# Patient Record
Sex: FEMALE | Race: WHITE
[De-identification: names, ages, dates, MRNs, and addresses within clinical notes are randomized per-mention and may not be internally consistent; named-entity substitution may affect disease eponyms.]

---

## 2019-11-04 ENCOUNTER — HOSPITAL ENCOUNTER (INPATIENT)
Dept: HOSPITAL 74 - JER | Age: 24
LOS: 3 days | Discharge: HOME | DRG: 420 | End: 2019-11-07
Attending: NURSE PRACTITIONER | Admitting: INTERNAL MEDICINE
Payer: COMMERCIAL

## 2019-11-04 VITALS — BODY MASS INDEX: 15 KG/M2

## 2019-11-04 DIAGNOSIS — E87.6: ICD-10-CM

## 2019-11-04 DIAGNOSIS — R64: ICD-10-CM

## 2019-11-04 DIAGNOSIS — E10.65: Primary | ICD-10-CM

## 2019-11-04 DIAGNOSIS — E86.9: ICD-10-CM

## 2019-11-04 DIAGNOSIS — R10.32: ICD-10-CM

## 2019-11-04 DIAGNOSIS — R00.0: ICD-10-CM

## 2019-11-04 DIAGNOSIS — E43: ICD-10-CM

## 2019-11-04 DIAGNOSIS — E87.1: ICD-10-CM

## 2019-11-04 LAB
ALBUMIN SERPL-MCNC: 3.5 G/DL (ref 3.4–5)
ALP SERPL-CCNC: 177 U/L (ref 45–117)
ALT SERPL-CCNC: 11 U/L (ref 13–61)
ANION GAP SERPL CALC-SCNC: 9 MMOL/L (ref 8–16)
APPEARANCE UR: (no result)
AST SERPL-CCNC: 4 U/L (ref 15–37)
BACTERIA # UR AUTO: (no result) /HPF
BASOPHILS # BLD: 0.5 % (ref 0–2)
BILIRUB SERPL-MCNC: 0.3 MG/DL (ref 0.2–1)
BILIRUB UR STRIP.AUTO-MCNC: NEGATIVE MG/DL
BUN SERPL-MCNC: 8.4 MG/DL (ref 7–18)
CALCIUM SERPL-MCNC: 8.9 MG/DL (ref 8.5–10.1)
CHLORIDE SERPL-SCNC: 88 MMOL/L (ref 98–107)
CO2 SERPL-SCNC: 31 MMOL/L (ref 21–32)
COLOR UR: YELLOW
CREAT SERPL-MCNC: 0.8 MG/DL (ref 0.55–1.3)
DEPRECATED RDW RBC AUTO: 13.9 % (ref 11.6–15.6)
EOSINOPHIL # BLD: 0.2 % (ref 0–4.5)
GLUCOSE SERPL-MCNC: 605 MG/DL (ref 74–106)
HCT VFR BLD CALC: 39.9 % (ref 32.4–45.2)
HGB BLD-MCNC: 13 GM/DL (ref 10.7–15.3)
INR BLD: 0.92 (ref 0.83–1.09)
KETONES UR QL STRIP: (no result)
LEUKOCYTE ESTERASE UR QL STRIP.AUTO: (no result)
LIPASE SERPL-CCNC: 147 U/L (ref 73–393)
LYMPHOCYTES # BLD: 33.1 % (ref 8–40)
MAGNESIUM SERPL-MCNC: 2.1 MG/DL (ref 1.8–2.4)
MCH RBC QN AUTO: 27.5 PG (ref 25.7–33.7)
MCHC RBC AUTO-ENTMCNC: 32.5 G/DL (ref 32–36)
MCV RBC: 84.7 FL (ref 80–96)
MONOCYTES # BLD AUTO: 7.5 % (ref 3.8–10.2)
NEUTROPHILS # BLD: 58.7 % (ref 42.8–82.8)
NITRITE UR QL STRIP: NEGATIVE
PH BLDV: 7.37 [PH] (ref 7.31–7.41)
PH UR: 6.5 [PH] (ref 5–8)
PHOSPHATE SERPL-MCNC: 3.4 MG/DL (ref 2.5–4.9)
PLATELET # BLD AUTO: 340 K/MM3 (ref 134–434)
PMV BLD: 9.4 FL (ref 7.5–11.1)
POTASSIUM SERPLBLD-SCNC: 3.9 MMOL/L (ref 3.5–5.1)
PROT SERPL-MCNC: 7.7 G/DL (ref 6.4–8.2)
PROT UR QL STRIP: (no result)
PROT UR QL STRIP: NEGATIVE
PT PNL PPP: 10.9 SEC (ref 9.7–13)
RBC # BLD AUTO: 4.71 M/MM3 (ref 3.6–5.2)
SODIUM SERPL-SCNC: 128 MMOL/L (ref 136–145)
SP GR UR: 1.04 (ref 1.01–1.03)
UROBILINOGEN UR STRIP-MCNC: 0.2 MG/DL (ref 0.2–1)
VENOUS PC02: 52.3 MMHG (ref 38–52)
VENOUS PO2: < 49 MMHG (ref 28–48)
WBC # BLD AUTO: 6 K/MM3 (ref 4–10)
WBC # UR AUTO: (no result) /HPF (ref 0–5)

## 2019-11-04 NOTE — PDOC
History of Present Illness





- General


Chief Complaint: Pain


Stated Complaint: STOMACH PAIN


Time Seen by Provider: 11/04/19 21:47





- History of Present Illness


Initial Comments: 





11/04/19 21:56


HPI


24 year old with a history of DM (noncompliant) who presents with 4 days of 

intermittent LLQ cramping abdominal pain that comes on intermittently sometimes 

preceded by eating and sometimes associated with nausea. She states the 

episodes last for 2 mins and self resolve. She admits to watery stools for the 

past several days as well. She denies fever or blood in the stool. Denies chest 

pain or shortness of breath. Denies any other symptoms.


LNMP March 2019. Patient normally has regular menses. She is sexually active 

but does not use contraception or prophylaxis








ROS


GENERAL/CONSTITUTIONAL: No fever or chills. No weakness.


CARDIOVASCULAR: No chest pain or shortness of breath


RESPIRATORY: No cough, wheezing, or hemoptysis.


GASTROINTESTINAL: No nausea, vomiting, + diarrhea No constipation.


GENITOURINARY: No dysuria, frequency, or change in urination.


MUSCULOSKELETAL: No joint or muscle swelling or pain. No neck or back pain.


SKIN: No rash


NEUROLOGIC: No headache, vertigo, loss of consciousness, or change in strength/

sensation.





PE


GENERAL: Awake, alert, and fully oriented, in no acute distress, very thin


HEAD: No signs of trauma, normocephalic, atraumatic 


EYES: EOMI, sclera anicteric, conjunctiva clear


ENT: oropharynx clear without exudates. Moist mucosa


NECK: Normal ROM, supple


LUNGS: No distress, speaks full sentences, clear to auscultation bilaterally 


HEART: Regular rate and rhythm, normal S1 and S2, no murmurs, rubs or gallops, 

peripheral pulses normal and equal bilaterally. 


ABDOMEN: Soft, nontender, normoactive bowel sounds.  No guarding, no rebound.  

No masses


EXTREMITIES : Normal inspection, Normal range of motion, no edema.  No clubbing 

or cyanosis. 


NEUROLOGICAL: Cranial nerves II through XII grossly intact.  Normal speech, 

normal gait, no focal sensorimotor deficits 


PELVIC: closed os, physiologic fluid, no CMT, no adnexal massess paplated








MDM





DDX including but not limited to:


Crohns vs UC


IBS


r/o ectopic preg





ED Course: 





fingerstick glucose is 501


labs sigfni for glucose at 600s


2 liters ns started 


patient does not exhibit signs of DKA on labwork


no anion gap


patient is an uncontrolled diabetic patient 


very thin appearing 


Will require close monitoring and started on diabetic regimen 


plan for admission








Mena Jacob PGY2


Emergency Medicine








Past History





- Past Medical History


Allergies/Adverse Reactions: 


 Allergies











Allergy/AdvReac Type Severity Reaction Status Date / Time


 


No Known Allergies Allergy   Verified 02/28/15 20:29











Home Medications: 


Ambulatory Orders





Insulin (Levemir) [Levemir Vial] 10 units SQ HS #300 units 11/06/19 


Insulin Sliding Scale [Novolog Vial Sliding Scale -] 1 vial SQ ACHS #1 bottle 11 /06/19 


Multivitamins [Multivit ([facility] Formulary)] 1 tab PO DAILY  tab 11/06/19 


Thiamine HCl [Vitamin B1 -] 100 mg PO DAILY  tablet 11/06/19 


Lancets/Blood Glucose Strips [Fora [id number]-C40 Strp-Lnct] 1 each [facility] ASDIR #

1 combo..pkg 11/07/19 


Miscellaneous Medical Supply [Glucometer Device] 1 each .ROUTE ASDIR #1 kit 11/ 07/19 


Miscellaneous Medical Supply [Glucometer Test Strips #100] 1 each .ROUTE ASDIR #

1 box 11/07/19 








COPD: No


Diabetes: Yes





- Immunization History


Immunization Up to Date: Yes





- Psycho Social/Smoking Cessation Hx


Smoking History: Never smoked


Have you smoked in the past 12 months: No


Information on smoking cessation initiated: No


Hx Alcohol Use: No


Drug/Substance Use Hx: No


Substance Use Type: None





*Physical Exam





- Vital Signs


 Last Vital Signs











Temp Pulse Resp BP Pulse Ox


 


 98.5 F   136 H  18   113/75   100 


 


 11/04/19 21:49  11/04/19 21:49  11/04/19 21:49  11/04/19 21:49  11/04/19 21:49














ED Treatment Course





- LABORATORY


CBC & Chemistry Diagram: 


 11/07/19 07:33





 11/07/19 07:33





Discharge





- Discharge Information


Problems reviewed: Yes


Clinical Impression/Diagnosis: 


 Abdominal pain





Condition: Improved


Disposition: HOME





- Follow up/Referral





- Patient Discharge Instructions





- Post Discharge Activity

## 2019-11-04 NOTE — PDOC
Attending Attestation





- Resident


Resident Name: Mena Jacob





- ED Attending Attestation


I have performed the following: I have examined & evaluated the patient, The 

case was reviewed & discussed with the resident, I agree w/resident's findings 

& plan





- HPI


HPI: 





11/04/19 23:40


see resident hpi





- Physicial Exam


PE: 





11/04/19 23:40


agree with resident exam





- Medical Decision Making





11/04/19 23:40


24-year-old female, noncompliant type I diabetic due to insurance reasons with 

diarrhea abdominal cramping and extreme weight loss


Labs consistent with diabetic hyperglycemia without secondary indications of 

diabetic ketoacidosis


Sugar is significantly elevated


Due to patient's appearance, glucose levels and lack of outpatient follow-up 

she will be admitted to medical service


Insulin and IV hydration administered in the emergency department

## 2019-11-04 NOTE — PDOC
Rapid Medical Evaluation


Time Seen by Provider: 11/04/19 21:47


Medical Evaluation: 


 Allergies











Allergy/AdvReac Type Severity Reaction Status Date / Time


 


No Known Allergies Allergy   Verified 02/28/15 20:29











11/04/19 21:47





CC: left sided abdominal pain





PE: No focal findings.





Orders: urine, labs





Patient will proceed to ER for continued evaluation.





**Discharge Disposition





- Diagnosis


 Abdominal pain








- Referrals





- Patient Instructions





- Post Discharge Activity

## 2019-11-05 LAB
ALBUMIN SERPL-MCNC: 2.5 G/DL (ref 3.4–5)
ALP SERPL-CCNC: 113 U/L (ref 45–117)
ALT SERPL-CCNC: 9 U/L (ref 13–61)
ANION GAP SERPL CALC-SCNC: 6 MMOL/L (ref 8–16)
AST SERPL-CCNC: 4 U/L (ref 15–37)
BASOPHILS # BLD: 0.4 % (ref 0–2)
BILIRUB SERPL-MCNC: 0.2 MG/DL (ref 0.2–1)
BUN SERPL-MCNC: 6 MG/DL (ref 7–18)
CALCIUM SERPL-MCNC: 8 MG/DL (ref 8.5–10.1)
CHLORIDE SERPL-SCNC: 108 MMOL/L (ref 98–107)
CO2 SERPL-SCNC: 26 MMOL/L (ref 21–32)
CREAT SERPL-MCNC: 0.4 MG/DL (ref 0.55–1.3)
DEPRECATED PREALB SERPL-CCNC: 14.2 MG/DL (ref 20–40)
DEPRECATED RDW RBC AUTO: 13.4 % (ref 11.6–15.6)
EOSINOPHIL # BLD: 0.8 % (ref 0–4.5)
GLUCOSE SERPL-MCNC: 140 MG/DL (ref 74–106)
HCT VFR BLD CALC: 31.4 % (ref 32.4–45.2)
HGB BLD-MCNC: 10.3 GM/DL (ref 10.7–15.3)
LYMPHOCYTES # BLD: 41.6 % (ref 8–40)
MAGNESIUM SERPL-MCNC: 1.9 MG/DL (ref 1.8–2.4)
MCH RBC QN AUTO: 27.3 PG (ref 25.7–33.7)
MCHC RBC AUTO-ENTMCNC: 32.9 G/DL (ref 32–36)
MCV RBC: 82.9 FL (ref 80–96)
MONOCYTES # BLD AUTO: 8 % (ref 3.8–10.2)
NEUTROPHILS # BLD: 49.2 % (ref 42.8–82.8)
PHOSPHATE SERPL-MCNC: 3.4 MG/DL (ref 2.5–4.9)
PLATELET # BLD AUTO: 266 K/MM3 (ref 134–434)
PMV BLD: 9.2 FL (ref 7.5–11.1)
POTASSIUM SERPLBLD-SCNC: 3.3 MMOL/L (ref 3.5–5.1)
PROT SERPL-MCNC: 5.4 G/DL (ref 6.4–8.2)
RBC # BLD AUTO: 3.79 M/MM3 (ref 3.6–5.2)
SODIUM SERPL-SCNC: 140 MMOL/L (ref 136–145)
WBC # BLD AUTO: 6.6 K/MM3 (ref 4–10)

## 2019-11-05 RX ADMIN — INSULIN DETEMIR SCH UNITS: 100 INJECTION, SOLUTION SUBCUTANEOUS at 22:32

## 2019-11-05 RX ADMIN — INSULIN ASPART SCH UNITS: 100 INJECTION, SOLUTION INTRAVENOUS; SUBCUTANEOUS at 12:17

## 2019-11-05 RX ADMIN — INSULIN ASPART SCH UNITS: 100 INJECTION, SOLUTION INTRAVENOUS; SUBCUTANEOUS at 22:31

## 2019-11-05 RX ADMIN — SODIUM CHLORIDE SCH MLS/HR: 9 INJECTION, SOLUTION INTRAVENOUS at 01:22

## 2019-11-05 RX ADMIN — INSULIN ASPART SCH UNITS: 100 INJECTION, SOLUTION INTRAVENOUS; SUBCUTANEOUS at 02:54

## 2019-11-05 RX ADMIN — INSULIN ASPART SCH UNITS: 100 INJECTION, SOLUTION INTRAVENOUS; SUBCUTANEOUS at 15:44

## 2019-11-05 RX ADMIN — SODIUM CHLORIDE SCH MLS/HR: 9 INJECTION, SOLUTION INTRAVENOUS at 02:42

## 2019-11-05 RX ADMIN — INSULIN ASPART SCH UNITS: 100 INJECTION, SOLUTION INTRAVENOUS; SUBCUTANEOUS at 18:00

## 2019-11-05 RX ADMIN — INSULIN ASPART SCH: 100 INJECTION, SOLUTION INTRAVENOUS; SUBCUTANEOUS at 06:20

## 2019-11-05 NOTE — EKG
Test Reason : 

Blood Pressure : ***/*** mmHG

Vent. Rate : 109 BPM     Atrial Rate : 109 BPM

   P-R Int : 126 ms          QRS Dur : 058 ms

    QT Int : 348 ms       P-R-T Axes : 060 093 073 degrees

   QTc Int : 468 ms

 

*** POOR DATA QUALITY, INTERPRETATION MAY BE ADVERSELY AFFECTED

SINUS TACHYCARDIA

POSSIBLE LEFT ATRIAL ENLARGEMENT

RIGHTWARD AXIS

NONSPECIFIC ST ABNORMALITY

ABNORMAL ECG

NO PREVIOUS ECGS AVAILABLE

Confirmed by Aaron Addison MD ([id number]) on 11/5/2019 11:26:13 AM

 

Referred By:             Confirmed By:Aaron Addison MD

## 2019-11-05 NOTE — PN
Teaching Attending Note


Name of Resident: Shelley Do





ATTENDING PHYSICIAN STATEMENT





I saw and evaluated the patient.


I reviewed the resident's note and discussed the case with the resident.


I agree with the resident's findings and plan as documented.








SUBJECTIVE:


24-year-old woman with history of diabetes mellitus, Came to hospital seeking 

medical attention because she was having some abdominal pain which is now 

resolved.  Patient has not been taking her insulin for the past few months due 

to loss of her insurance.  She has regained insurance recently.  She denied any 

eating disorders although she notes that  she has decreased appetite lately.





OBJECTIVE:


 Last Vital Signs











Temp Pulse Resp BP Pulse Ox


 


 98.5 F   136 H  18   113/75   100 


 


 11/04/19 21:49  11/04/19 21:49  11/04/19 21:49  11/04/19 21:49  11/04/19 21:49








GENERAL:


Cachectic, wasted, not in acute distress


HEENT:


Normocephalic, atraumatic. PERRLA, EOMI. No conjunctival pallor. Sunken cheeks


NECK: 


Supple. Full ROM. No JVD. Carotid pulses 2+ and symmetric, without bruits. No 

thyromegaly. No lymphadenopathy.


CARDIOVASCULAR:


Regular rate and rhythm. No murmurs, rubs, or gallops. Distal pulses are 2+ and 

symmetric. 


PULMONARY: 


No evidence of respiratory distress. Lungs clear to auscultation bilaterally. 

No wheezing, rales or rhonchi.


ABDOMINAL:


Soft. Non-tender. Non-distended. No rebound or guarding. No organomegaly. 

Normoactive bowel sounds. 


MUSCULOSKELETAL 


Normal range of motion at all joints. No bony deformities or tenderness. No CVA 

tenderness.


EXTREMITIES: 


No cyanosis. No clubbing. No edema. No calf tenderness.Prominent bones


SKIN: 


Warm and dry. Normal capillary refill. No rashes. No jaundice. 


PSYCHIATRIC: 


Cooperative. Good eye contact. Appropriate mood and affect.





 Abnormal Lab Results











  11/04/19 11/04/19 11/04/19





  22:17 22:17 22:17


 


POC VBG pCO2   


 


POC VBG pO2   


 


VBG HCO3   


 


VBG O2 Sat (Yanique)   


 


VBG Base Excess   


 


Sodium    128 L


 


Chloride    88 L


 


Random Glucose    605 H*


 


AST    4 L


 


ALT    11 L


 


Alkaline Phosphatase    177 H


 


Creatine Kinase   15 L 


 


Beta-Hydroxybutyrate  < 0.2 L  


 


Ur Specific Gravity   


 


Urine Glucose (UA)   


 


Urine Ketones   


 


Ur Leukocyte Esterase   














  11/04/19 11/04/19





  22:17 22:20


 


POC VBG pCO2  52.3 H 


 


POC VBG pO2  < 49 H 


 


VBG HCO3  29.2 H 


 


VBG O2 Sat (Yanique)  30.3 L 


 


VBG Base Excess  3.3 H 


 


Sodium  


 


Chloride  


 


Random Glucose  


 


AST  


 


ALT  


 


Alkaline Phosphatase  


 


Creatine Kinase  


 


Beta-Hydroxybutyrate  


 


Ur Specific Gravity   1.043 H


 


Urine Glucose (UA)   3+ H


 


Urine Ketones   Trace H


 


Ur Leukocyte Esterase   1+ H








Imaging reviewed





ASSESSMENT AND PLAN:


24-year-old woman who with hyper glycemic hyperosmolar state, no evidence of an 

gap or ketones on blood work.Do not believe she is in DKA at this time. 

Elevated alk phosshould rule out cholecystitis given history of abdominal 

pain.  Pseudohyponatremia secondary to hyperglycemia. Corrected sodium is 138. 

Negative urine pregnancy test.


MedSurg


Abdominal ultrasound to rule out cholecystitis


Chest x-ray


IV fluid hydration


Blood glucose checks every 4 hours


A1c


NovoLog tight sliding scale


10 units Levemir nightly


Clear liquid diets, advance as tolerated


Zofran IV as needed if nausea


Recheck electrolytes including mag and phosphorus and supplement as needed


Diabetic educator


 intervention for medication approval


#Cachexiamay be secondary to underlying insulin deficiency.  Insulin is noted 

to be an anabolic hormone and lack of it will result in wasting.  Patient 

otherwise denied any kind of eating disorder her sister agreed.


SCDs for DVT prophylaxis

## 2019-11-05 NOTE — EKG
Test Reason : 

Blood Pressure : ***/*** mmHG

Vent. Rate : 114 BPM     Atrial Rate : 114 BPM

   P-R Int : 132 ms          QRS Dur : 060 ms

    QT Int : 326 ms       P-R-T Axes : 059 090 068 degrees

   QTc Int : 449 ms

 

SINUS TACHYCARDIA

RIGHTWARD AXIS

BORDERLINE ECG

NO PREVIOUS ECGS AVAILABLE

Confirmed by MD Bell, Giovanni ([id number]) on 11/5/2019 9:19:41 AM

 

Referred By:             Confirmed By:Giovanni Luu MD

## 2019-11-05 NOTE — PN
Progress Note, Physician


Chief Complaint: 





Very lethargic but arousable, stated she did not sleep much last night


History of Present Illness: 





23 y/o F w/ pmhx of Type 1 diabetes presenting to ED complaining LLQ abdominal 

pain onset x few days with worsening prior to ED.  Pts pain is sharp, comes 

and goes, and radiates to RLQ at times. Pt rates pain as 8/10. Pt eats small 

meals because she experiences early satiety and nausea with larger meals. She 

also states her pain is worse with eating. Nothing alleviates the pain but it 

intermittently resolves on its own. No insulin for past year or f/u with 

endocrinology


 d/t insurance issues. Pt also complaining of multiple episodes of watery brown

, non-bloody diarrhea for the past few days. In the last few months the pt has 

noted that she has had polyuria, increased thirst and a 30lb weight loss (she 

reports her normal weight 130lb). The patient states she is concerned about her 

lack of appetite and recent weight loss. Pt denies HA, fever, chills, chest pain

, SOB, and numbness and tingling in lower extremeties.  











- Current Medication List


Current Medications: 


Active Medications





Sodium Chloride (Normal Saline -)  1,000 mls @ 125 mls/hr IV ASDIR [facility]


   Last Admin: 11/05/19 02:42 Dose:  125 mls/hr


Insulin Aspart (Novolog Vial Sliding Scale -)  1 vial SQ Q4H [facility]; Protocol


   Last Admin: 11/05/19 06:20 Dose:  Not Given











- Objective


Vital Signs: 


 Vital Signs











Temperature  98.7 F   11/05/19 06:06


 


Pulse Rate  90   11/05/19 06:06


 


Respiratory Rate  18   11/05/19 06:06


 


Blood Pressure  98/63   11/05/19 06:06


 


O2 Sat by Pulse Oximetry (%)  99   11/05/19 02:15











Constitutional: Yes: Well Nourished, No Distress, Calm


Eyes: Yes: WNL, Conjunctiva Clear


HENT: Yes: WNL, Atraumatic, Normocephalic, Other (poor dentation)


Neck: Yes: WNL, Supple, Trachea Midline


Cardiovascular: Yes: WNL, Regular Rate and Rhythm, Tachycardia (HR 90s)


Respiratory: Yes: WNL, Regular, CTA Bilaterally


Gastrointestinal: Yes: WNL, Normal Bowel Sounds


...Rectal Exam: Yes: Deferred


Genitourinary: Yes: WNL


Breast(s): Yes: WNL


Musculoskeletal: Yes: WNL


Extremities: Yes: WNL


Edema: No


Peripheral Pulses WNL: Yes


Peripheral Pulses: Left Radial: 2+, Right Radial: 2+, Left Doralis Pedis: 2+, 

Right Dorsalis Pedis: 2+, Left Femoral: 2+, Right Femoral: 2+


Integumentary: Yes: WNL


Neurological: Yes: WNL, Alert, Oriented


...Motor Strength: WNL


Psychiatric: Yes: WNL, Alert, Oriented


Labs: 


 CBC, BMP





 11/05/19 06:40 





 11/05/19 06:40 





 INR, PTT











INR  0.92  (0.83-1.09)   11/04/19  22:20    














- ....Imaging


Chest X-ray: Report Reviewed, Image Reviewed (No acute pathology)


Ultrasound: Report Reviewed (US contracted GB with no evidence of acute 

cholecyctitis, echogenic right kidney)





Problem List





- Problems


(1) Prophylactic measure


Assessment/Plan: 


FEN


on clears, advance diet as tolerated


monitor electrolytes


c/w IVF until tolerating full PO





DVT


ambulatory





Dispo


maintain as in [facility]


full code


discharge planning


Code(s): Z29.9 - ENCOUNTER FOR PROPHYLACTIC MEASURES, UNSPECIFIED   





(2) Abdominal pain


Assessment/Plan: 


resolving


advance diet as tolerated


zofran/reglan prn


c/w IVF


Code(s): R10.9 - UNSPECIFIED ABDOMINAL PAIN   





(3) Hyperglycemia


Assessment/Plan: 


hyperglycemic, hyperosmolar state 


 BGM q4h with sliding scale


10u Levemir q HS


A1C 15.6


clear liquid diet, advance as tolerated


diabetic education


endocrinology consultation requested


Code(s): R73.9 - HYPERGLYCEMIA, UNSPECIFIED   





(4) Hypokalemia


Assessment/Plan: 


replete potassium


monitor electrolytes





Code(s): E87.6 - HYPOKALEMIA   





Visit type





- Emergency Visit


Emergency Visit: Yes


ED Registration Date: 11/04/19


Care time: The patient presented to the Emergency Department on the above date 

and was hospitalized for further evaluation of their emergent condition.





- New Patient


This patient is new to me today: Yes


Date on this admission: 11/05/19





- Critical Care


Critical Care patient: No





- Discharge Referral


Referred to [facility] No

## 2019-11-05 NOTE — HP
CHIEF COMPLAINT: LLQ pain





PCP: none





HISTORY OF PRESENT ILLNESS:





Pt is a 25 y/o F w/ pmhx of Type 1 diabetes presenting to ED complaining LLQ 

abdominal pain onset few days ago but acutely worsened today. Pts pain is sharp

, comes and goes, and radiates to RLQ at times. Pt rates pain as 8/10. Pt 

eats small meals because she experiences early satiety and nausea with larger 

meals. She also states her pain is worse with eating. Nothing alleviates the 

pain but it intermittently resolves on its own. She has not taken her insulin 

or followed up with an endocrinologist or PCP for the past year because of her 

insurance lapsing. She states prior to this she had taken her insulin regularly 

and followed up with Drs regularly. Pt also complaining of multiple episodes of 

watery brown, non-bloody diarrhea for the past few days. In the last few months 

the pt has noted that she has had polyuria, increased thirst and a 30lb weight 

loss (she reports her normal weight 130lb). The patient states she is concerned 

about her lack of appetite and recent weight loss. Pt denies HA, fever, chills, 

chest pain, SOB, and numbness and tingling in lower extremeties.  








ER course was notable for:


(1) 10u insulin 


(2) 1L NS bolus








Recent Travel: denies





PAST MEDICAL HISTORY: Type 1 diabetes (diagnosed 2015)





PAST SURGICAL HISTORY: denies





Social History:


Smoking: denies


Alcohol: denies


Drugs: denies





LNMP: March 2019


Occupation: 


Residence: pt lives with her sister


Insurance: [employer]





Allergies





No Known Allergies Allergy (Verified 02/28/15 20:29)


 








HOME MEDICATIONS:


 Home Medications











 Medication  Instructions  Recorded


 


NK [No Known Home Medication]  02/28/15








REVIEW OF SYSTEMS


CONSTITUTIONAL: weight change- 30lb wt loss in 2-3mo


Absent:  fever, chills, diaphoresis, generalized weakness, malaise, loss of 

appetite, 


HEENT: 


Absent:  rhinorrhea, nasal congestion, throat pain, throat swelling, difficulty 

swallowing, mouth swelling, ear pain, eye pain, visual changes


CARDIOVASCULAR: 


Absent: chest pain, syncope, palpitations, irregular heart rate, lightheadedness

, peripheral edema


RESPIRATORY: 


Absent: cough, shortness of breath, dyspnea with exertion, orthopnea, wheezing, 

stridor, hemoptysis


GASTROINTESTINAL: LLQ/ RLQ abdominal pain, nausea, diarrhea, 


Absent:  abdominal distension,  vomiting, constipation, melena, hematochezia


GENITOURINARY: increased frequency,


Absent: dysuria,  urgency, hesitancy, hematuria, flank pain, genital pain


MUSCULOSKELETAL: back pain


Absent: myalgia, arthralgia, joint swelling,  neck pain


SKIN: 


Absent: rash, itching, pallor


HEMATOLOGIC/IMMUNOLOGIC: 


Absent: easy bleeding, easy bruising, lymphadenopathy, frequent infections


ENDOCRINE: unexplained weight loss, increased thirst


Absent: unexplained weight gain,  heat intolerance, cold intolerance


NEUROLOGIC: 


Absent: headache, focal weakness or paresthesias, dizziness, unsteady gait, 

seizure, mental status changes, bladder or bowel incontinence


PSYCHIATRIC: 


Absent: anxiety, depression, suicidal or homicidal ideation, hallucinations.








PHYSICAL EXAMINATION


 Vital Signs - 24 hr











  11/04/19





  21:49


 


Temperature 98.5 F


 


Pulse Rate 136 H


 


Respiratory 18





Rate 


 


Blood Pressure 113/75


 


O2 Sat by Pulse 100





Oximetry (%) 











GENERAL: Awake, alert, and fully oriented, in no acute distress. Extremely thin 

and frail appearing. 


HEAD: Normal with no signs of trauma.


EYES: Pupils equal, round and reactive to light, extraocular movements intact, 

sclera anicteric, conjunctiva clear. No lid lag.


EARS, NOSE, THROAT: Ears normal, nares patent, oropharynx clear without 

exudates. Dry mucous membranes, poor dentition with dental plaque and carries 

on multiple teeth especially molars


NECK: Normal range of motion, supple without lymphadenopathy, JVD, or masses.


LUNGS: Breath sounds equal, clear to auscultation bilaterally. No wheezes, and 

no crackles. No accessory muscle use.


HEART: Regular rate and rhythm, normal S1 and S2 without murmur, rub or gallop.


ABDOMEN: Soft, nontender, not distended, hypoactive bowel sounds, no guarding, 

no rebound, no masses.  No hepatomegaly or  splenomegaly. 


MUSCULOSKELETAL: Normal range of motion at all joints. No bony deformities or 

tenderness. No CVA tenderness.


UPPER EXTREMITIES: 2+ pulses, warm, well-perfused. No cyanosis. No clubbing. No 

peripheral edema.


LOWER EXTREMITIES: 2+ pulses, warm, well-perfused. No calf tenderness. No 

peripheral edema. 


NEUROLOGICAL:  Cranial nerves II-XII intact. Normal speech. Normal gait.


PSYCHIATRIC: Cooperative. Good eye contact. Appropriate mood and affect.


SKIN: Warm, dry, normal turgor, no rashes or lesions noted, normal capillary 

refill. 





 Laboratory Results - last 24 hr











  11/04/19 11/04/19 11/04/19





  22:17 22:17 22:17


 


WBC    6.0


 


RBC    4.71


 


Hgb    13.0


 


Hct    39.9


 


MCV    84.7


 


MCH    27.5  D


 


MCHC    32.5


 


RDW    13.9  D


 


Plt Count    340  D


 


MPV    9.4


 


Absolute Neuts (auto)    3.6


 


Neutrophils %    58.7


 


Lymphocytes %    33.1


 


Monocytes %    7.5


 


Eosinophils %    0.2


 


Basophils %    0.5


 


Nucleated RBC %    0


 


PT with INR   


 


INR   


 


PTT (Actin FS)   


 


VBG pH   


 


POC VBG pCO2   


 


POC VBG pO2   


 


VBG HCO3   


 


VBG O2 Sat (Yanique)   


 


VBG Base Excess   


 


Sodium   


 


Potassium   


 


Chloride   


 


Carbon Dioxide   


 


Anion Gap   


 


BUN   


 


Creatinine   


 


Est GFR (CKD-EPI)AfAm   


 


Est GFR (CKD-EPI)NonAf   


 


POC Glucometer   


 


Random Glucose   


 


Calcium   


 


Phosphorus   


 


Magnesium   


 


Total Bilirubin   


 


AST   


 


ALT   


 


Alkaline Phosphatase   


 


Creatine Kinase   15 L 


 


Troponin I   < 0.02 


 


Total Protein   


 


Albumin   


 


Lipase   


 


Beta-Hydroxybutyrate  < 0.2 L  


 


TSH   


 


Urine Color   


 


Urine Appearance   


 


Urine pH   


 


Ur Specific Gravity   


 


Urine Protein   


 


Urine Glucose (UA)   


 


Urine Ketones   


 


Urine Blood   


 


Urine Nitrite   


 


Urine Bilirubin   


 


Urine Urobilinogen   


 


Ur Leukocyte Esterase   


 


Urine WBC (Auto)   


 


Urine Bacteria (Auto)   


 


Urine HCG, Qual   


 


Stool Occult Blood   














  11/04/19 11/04/19 11/04/19





  22:17 22:17 22:17


 


WBC   


 


RBC   


 


Hgb   


 


Hct   


 


MCV   


 


MCH   


 


MCHC   


 


RDW   


 


Plt Count   


 


MPV   


 


Absolute Neuts (auto)   


 


Neutrophils %   


 


Lymphocytes %   


 


Monocytes %   


 


Eosinophils %   


 


Basophils %   


 


Nucleated RBC %   


 


PT with INR   


 


INR   


 


PTT (Actin FS)   


 


VBG pH    7.37


 


POC VBG pCO2    52.3 H


 


POC VBG pO2    < 49 H


 


VBG HCO3    29.2 H


 


VBG O2 Sat (Yanique)    30.3 L


 


VBG Base Excess    3.3 H


 


Sodium  128 L  


 


Potassium  3.9  


 


Chloride  88 L  


 


Carbon Dioxide  31  


 


Anion Gap  9  


 


BUN  8.4  


 


Creatinine  0.8  


 


Est GFR (CKD-EPI)AfAm  121.29  


 


Est GFR (CKD-EPI)NonAf  104.65  


 


POC Glucometer   


 


Random Glucose  605 H*  


 


Calcium  8.9  


 


Phosphorus  3.4  


 


Magnesium  2.1  


 


Total Bilirubin  0.3  


 


AST  4 L  


 


ALT  11 L  


 


Alkaline Phosphatase  177 H  


 


Creatine Kinase   


 


Troponin I   


 


Total Protein  7.7  


 


Albumin  3.5  


 


Lipase  147  Cancelled 


 


Beta-Hydroxybutyrate   


 


TSH  2.68  


 


Urine Color   


 


Urine Appearance   


 


Urine pH   


 


Ur Specific Gravity   


 


Urine Protein   


 


Urine Glucose (UA)   


 


Urine Ketones   


 


Urine Blood   


 


Urine Nitrite   


 


Urine Bilirubin   


 


Urine Urobilinogen   


 


Ur Leukocyte Esterase   


 


Urine WBC (Auto)   


 


Urine Bacteria (Auto)   


 


Urine HCG, Qual   


 


Stool Occult Blood   














  11/04/19 11/04/19 11/04/19





  22:20 22:20 22:20


 


WBC   


 


RBC   


 


Hgb   


 


Hct   


 


MCV   


 


MCH   


 


MCHC   


 


RDW   


 


Plt Count   


 


MPV   


 


Absolute Neuts (auto)   


 


Neutrophils %   


 


Lymphocytes %   


 


Monocytes %   


 


Eosinophils %   


 


Basophils %   


 


Nucleated RBC %   


 


PT with INR   


 


INR   


 


PTT (Actin FS)    33.0


 


VBG pH   


 


POC VBG pCO2   


 


POC VBG pO2   


 


VBG HCO3   


 


VBG O2 Sat (Yanique)   


 


VBG Base Excess   


 


Sodium   


 


Potassium   


 


Chloride   


 


Carbon Dioxide   


 


Anion Gap   


 


BUN   


 


Creatinine   


 


Est GFR (CKD-EPI)AfAm   


 


Est GFR (CKD-EPI)NonAf   


 


POC Glucometer   


 


Random Glucose   


 


Calcium   


 


Phosphorus   


 


Magnesium   


 


Total Bilirubin   


 


AST   


 


ALT   


 


Alkaline Phosphatase   


 


Creatine Kinase   


 


Troponin I   


 


Total Protein   


 


Albumin   


 


Lipase   


 


Beta-Hydroxybutyrate   


 


TSH   


 


Urine Color   Yellow 


 


Urine Appearance   Cloudy 


 


Urine pH   6.5  D 


 


Ur Specific Gravity   1.043 H 


 


Urine Protein   Negative 


 


Urine Glucose (UA)   3+ H 


 


Urine Ketones   Trace H 


 


Urine Blood   Negative 


 


Urine Nitrite   Negative 


 


Urine Bilirubin   Negative 


 


Urine Urobilinogen   0.2 


 


Ur Leukocyte Esterase   1+ H 


 


Urine WBC (Auto)   3-5 


 


Urine Bacteria (Auto)   Few 


 


Urine HCG, Qual  Negative  


 


Stool Occult Blood   














  11/04/19 11/04/19 11/04/19





  22:20 22:30 23:09


 


WBC   


 


RBC   


 


Hgb   


 


Hct   


 


MCV   


 


MCH   


 


MCHC   


 


RDW   


 


Plt Count   


 


MPV   


 


Absolute Neuts (auto)   


 


Neutrophils %   


 


Lymphocytes %   


 


Monocytes %   


 


Eosinophils %   


 


Basophils %   


 


Nucleated RBC %   


 


PT with INR  10.90  


 


INR  0.92  


 


PTT (Actin FS)   


 


VBG pH   


 


POC VBG pCO2   


 


POC VBG pO2   


 


VBG HCO3   


 


VBG O2 Sat (Yanique)   


 


VBG Base Excess   


 


Sodium   


 


Potassium   


 


Chloride   


 


Carbon Dioxide   


 


Anion Gap   


 


BUN   


 


Creatinine   


 


Est GFR (CKD-EPI)AfAm   


 


Est GFR (CKD-EPI)NonAf   


 


POC Glucometer    501


 


Random Glucose   


 


Calcium   


 


Phosphorus   


 


Magnesium   


 


Total Bilirubin   


 


AST   


 


ALT   


 


Alkaline Phosphatase   


 


Creatine Kinase   


 


Troponin I   


 


Total Protein   


 


Albumin   


 


Lipase   


 


Beta-Hydroxybutyrate   


 


TSH   


 


Urine Color   


 


Urine Appearance   


 


Urine pH   


 


Ur Specific Gravity   


 


Urine Protein   


 


Urine Glucose (UA)   


 


Urine Ketones   


 


Urine Blood   


 


Urine Nitrite   


 


Urine Bilirubin   


 


Urine Urobilinogen   


 


Ur Leukocyte Esterase   


 


Urine WBC (Auto)   


 


Urine Bacteria (Auto)   


 


Urine HCG, Qual   


 


Stool Occult Blood   Negative 














  11/05/19





  00:43


 


WBC 


 


RBC 


 


Hgb 


 


Hct 


 


MCV 


 


MCH 


 


MCHC 


 


RDW 


 


Plt Count 


 


MPV 


 


Absolute Neuts (auto) 


 


Neutrophils % 


 


Lymphocytes % 


 


Monocytes % 


 


Eosinophils % 


 


Basophils % 


 


Nucleated RBC % 


 


PT with INR 


 


INR 


 


PTT (Actin FS) 


 


VBG pH 


 


POC VBG pCO2 


 


POC VBG pO2 


 


VBG HCO3 


 


VBG O2 Sat (Yanique) 


 


VBG Base Excess 


 


Sodium 


 


Potassium 


 


Chloride 


 


Carbon Dioxide 


 


Anion Gap 


 


BUN 


 


Creatinine 


 


Est GFR (CKD-EPI)AfAm 


 


Est GFR (CKD-EPI)NonAf 


 


POC Glucometer  380


 


Random Glucose 


 


Calcium 


 


Phosphorus 


 


Magnesium 


 


Total Bilirubin 


 


AST 


 


ALT 


 


Alkaline Phosphatase 


 


Creatine Kinase 


 


Troponin I 


 


Total Protein 


 


Albumin 


 


Lipase 


 


Beta-Hydroxybutyrate 


 


TSH 


 


Urine Color 


 


Urine Appearance 


 


Urine pH 


 


Ur Specific Gravity 


 


Urine Protein 


 


Urine Glucose (UA) 


 


Urine Ketones 


 


Urine Blood 


 


Urine Nitrite 


 


Urine Bilirubin 


 


Urine Urobilinogen 


 


Ur Leukocyte Esterase 


 


Urine WBC (Auto) 


 


Urine Bacteria (Auto) 


 


Urine HCG, Qual 


 


Stool Occult Blood 








ASSESSMENT/PLAN:


Pt is a 25 y/o F w/ pmhx of Type 1 diabetes presenting to ED complaining LLQ 

abdominal pain onset few days ago but acutely worsened today. 





# Abdominal pain- patient found to be in hyperglycemic, hyperosmolar state 

which may be causin her polyuria, polydipsia, weight loss, and abdominal pain. 

She is a T1 diabetic, not taking any insulin for the past year 2/2 insurance 

lapse. Glucose 605 without any evidence of anion gap or ketones on blood work/ 

UA, given the lab findings it is unlikely that she is in DKA at this time. Must 

also r/o pregnancy given her age and the fact that she is not on any 

contraception however this is not likely as the patient states she has not been 

sexually active in years and her lack of period may be explained by her BMI of 

14. 


- CXR


-  IV NS@ 125cc/h


- BGM q4h


- ISS


- 10u Levemir nightly


- f/u A1C


- clear liquid diet, advance as tolerated


- IV zofran PRN for nausea


- f/u CMP including mag/ phos, replete PRN


- diabetic education


- social work for medication approval


- Pregnancy test is negative








# Elevated Alk phos- pt complaining of many months of nonspecific abdominal 

pain that is worse with eating, given the elevated alk phos should r/o 

cholecystitis. 


- Abd u/s 





# Pseudo Hyponatremia- 2/2 hyperglycemia, corrected Na is 136


- IV NS@ 125cc/h








# Cachexiamay be secondary to underlying insulin deficiency.  Insulin is noted 

to be an anabolic hormone and lack of it will result in wasting. Patient 

otherwise denied any kind of eating disorder her sister agreed.








#FEN


 - IV NS@ 125cc/h


- monitor lytes, replete PRN


- clear liquid diet, advance as tolerated





#PPx


SCDs for DVT prophylaxis





#Dispo- admit to [facility]











Visit type





- Emergency Visit


Emergency Visit: Yes


ED Registration Date: 11/04/19


Care time: The patient presented to the Emergency Department on the above date 

and was hospitalized for further evaluation of their emergent condition.





- New Patient


This patient is new to me today: Yes


Date on this admission: 11/05/19





- Critical Care


Critical Care patient: No





ATTENDING PHYSICIAN STATEMENT





I saw and evaluated the patient.


I reviewed the resident's note and discussed the case with the resident.


I agree with the resident's findings and plan as documented.








SUBJECTIVE:








OBJECTIVE:








ASSESSMENT AND PLAN:

## 2019-11-06 LAB
ALBUMIN SERPL-MCNC: 2.8 G/DL (ref 3.4–5)
ALP SERPL-CCNC: 118 U/L (ref 45–117)
ALT SERPL-CCNC: 9 U/L (ref 13–61)
ANION GAP SERPL CALC-SCNC: 4 MMOL/L (ref 8–16)
AST SERPL-CCNC: 6 U/L (ref 15–37)
BASOPHILS # BLD: 0.3 % (ref 0–2)
BILIRUB SERPL-MCNC: 0.2 MG/DL (ref 0.2–1)
BUN SERPL-MCNC: 5.9 MG/DL (ref 7–18)
CALCIUM SERPL-MCNC: 8.6 MG/DL (ref 8.5–10.1)
CHLORIDE SERPL-SCNC: 103 MMOL/L (ref 98–107)
CO2 SERPL-SCNC: 30 MMOL/L (ref 21–32)
CREAT SERPL-MCNC: 0.6 MG/DL (ref 0.55–1.3)
DEPRECATED RDW RBC AUTO: 13.6 % (ref 11.6–15.6)
EOSINOPHIL # BLD: 0.2 % (ref 0–4.5)
GLUCOSE SERPL-MCNC: 305 MG/DL (ref 74–106)
HCT VFR BLD CALC: 35.7 % (ref 32.4–45.2)
HGB BLD-MCNC: 11.6 GM/DL (ref 10.7–15.3)
LYMPHOCYTES # BLD: 27.6 % (ref 8–40)
MAGNESIUM SERPL-MCNC: 2 MG/DL (ref 1.8–2.4)
MCH RBC QN AUTO: 27.3 PG (ref 25.7–33.7)
MCHC RBC AUTO-ENTMCNC: 32.5 G/DL (ref 32–36)
MCV RBC: 83.8 FL (ref 80–96)
MONOCYTES # BLD AUTO: 6.1 % (ref 3.8–10.2)
NEUTROPHILS # BLD: 65.8 % (ref 42.8–82.8)
PLATELET # BLD AUTO: 283 K/MM3 (ref 134–434)
PMV BLD: 9.8 FL (ref 7.5–11.1)
POTASSIUM SERPLBLD-SCNC: 4.2 MMOL/L (ref 3.5–5.1)
PROT SERPL-MCNC: 5.8 G/DL (ref 6.4–8.2)
RBC # BLD AUTO: 4.26 M/MM3 (ref 3.6–5.2)
SODIUM SERPL-SCNC: 137 MMOL/L (ref 136–145)
WBC # BLD AUTO: 7.1 K/MM3 (ref 4–10)

## 2019-11-06 RX ADMIN — MULTIVITAMIN TABLET SCH TAB: TABLET at 10:35

## 2019-11-06 RX ADMIN — SODIUM CHLORIDE SCH MLS/HR: 9 INJECTION, SOLUTION INTRAVENOUS at 13:48

## 2019-11-06 RX ADMIN — SODIUM CHLORIDE SCH MLS/HR: 9 INJECTION, SOLUTION INTRAVENOUS at 01:31

## 2019-11-06 RX ADMIN — INSULIN ASPART SCH: 100 INJECTION, SOLUTION INTRAVENOUS; SUBCUTANEOUS at 05:32

## 2019-11-06 RX ADMIN — INSULIN ASPART SCH: 100 INJECTION, SOLUTION INTRAVENOUS; SUBCUTANEOUS at 01:38

## 2019-11-06 RX ADMIN — Medication SCH MG: at 10:35

## 2019-11-06 RX ADMIN — INSULIN ASPART SCH UNITS: 100 INJECTION, SOLUTION INTRAVENOUS; SUBCUTANEOUS at 21:37

## 2019-11-06 RX ADMIN — INSULIN DETEMIR SCH UNITS: 100 INJECTION, SOLUTION SUBCUTANEOUS at 21:37

## 2019-11-06 RX ADMIN — INSULIN ASPART SCH UNITS: 100 INJECTION, SOLUTION INTRAVENOUS; SUBCUTANEOUS at 10:35

## 2019-11-06 RX ADMIN — INSULIN ASPART SCH UNITS: 100 INJECTION, SOLUTION INTRAVENOUS; SUBCUTANEOUS at 13:49

## 2019-11-06 RX ADMIN — INSULIN ASPART SCH UNITS: 100 INJECTION, SOLUTION INTRAVENOUS; SUBCUTANEOUS at 16:58

## 2019-11-06 NOTE — PN
Progress Note, Physician


Chief Complaint: 





Bloow sugars better controlled overnight. Tolerating PO diet now. Feels much 

better


History of Present Illness: 





25 y/o F w/ pmhx of Type 1 diabetes presenting to ED complaining LLQ abdominal 

pain onset x few days with worsening prior to ED.  Pts pain is sharp, comes 

and goes, and radiates to RLQ at times. Pt rates pain as 8/10. Pt eats small 

meals because she experiences early satiety and nausea with larger meals. She 

also states her pain is worse with eating. Nothing alleviates the pain but it 

intermittently resolves on its own. No insulin for past year or f/u with 

endocrinology


 d/t insurance issues. Pt also complaining of multiple episodes of watery brown

, non-bloody diarrhea for the past few days. In the last few months the pt has 

noted that she has had polyuria, increased thirst and a 30lb weight loss (she 

reports her normal weight 130lb). The patient states she is concerned about her 

lack of appetite and recent weight loss. Pt denies HA, fever, chills, chest pain

, SOB, and numbness and tingling in lower extremeties.  











- Current Medication List


Current Medications: 


Active Medications





Sodium Chloride (Normal Saline -)  1,000 mls @ 125 mls/hr IV ASDIR [facility]


   Last Admin: 11/06/19 01:31 Dose:  125 mls/hr


Insulin Aspart (Novolog Vial Sliding Scale -)  1 vial SQ Q4H [facility]; Protocol


   Last Admin: 11/06/19 05:32 Dose:  Not Given


Insulin Detemir (Levemir Vial)  10 units SQ HS [facility]


   Last Admin: 11/05/19 22:32 Dose:  10 units











- Objective


Vital Signs: 


 Vital Signs











Temperature  98.4 F   11/06/19 06:06


 


Pulse Rate  78   11/06/19 06:06


 


Respiratory Rate  18   11/06/19 06:06


 


Blood Pressure  103/62   11/06/19 06:06


 


O2 Sat by Pulse Oximetry (%)  99   11/05/19 09:00











Additional Findings/Remarks: 








Constitutional: Yes: Well Nourished, No Distress, Calm


Eyes: Yes: WNL, Conjunctiva Clear


HENT: Yes: WNL, Atraumatic, Normocephalic, Other (poor dentation)


Neck: Yes: WNL, Supple, Trachea Midline


Cardiovascular: Yes: WNL, Regular Rate and Rhythm, Tachycardia (HR 90s)


Respiratory: Yes: WNL, Regular, CTA Bilaterally


Gastrointestinal: Yes: WNL, Normal Bowel Sounds


...Rectal Exam: Yes: Deferred


Genitourinary: Yes: WNL


Breast(s): Yes: WNL


Musculoskeletal: Yes: WNL


Extremities: Yes: WNL


Edema: No


Peripheral Pulses WNL: Yes


Peripheral Pulses: Left Radial: 2+, Right Radial: 2+, Left Doralis Pedis: 2+, 

Right Dorsalis Pedis: 2+, Left Femoral: 2+, Right Femoral: 2+


Integumentary: Yes: WNL


Neurological: Yes: WNL, Alert, Oriented


...Motor Strength: WNL


Psychiatric: Yes: WNL, Alert, Oriented





Labs: 


 CBC, BMP





 11/05/19 06:40 





 11/05/19 06:40 





 INR, PTT











INR  0.92  (0.83-1.09)   11/04/19  22:20    














Problem List





- Problems


(1) Prophylactic measure


Assessment/Plan: 


FEN


tolerting diabetic diet with snacks and additional supplements/magic cup


monitor electrolytes


c/w IVF until tolerating full PO





DVT


ambulatory





Dispo


maintain as in patient


full code


discharge planning


Code(s): Z29.9 - ENCOUNTER FOR PROPHYLACTIC MEASURES, UNSPECIFIED   





(2) Abdominal pain


Assessment/Plan: 


resolved


advance diet as tolerating PO


d/c IVF


Code(s): R10.9 - UNSPECIFIED ABDOMINAL PAIN   





(3) Hyperglycemia


Assessment/Plan: 


hyperglycemic, hyperosmolar state on admission


BGM better controlled overnight


c/w BGM q4h with sliding scale


10u Levemir q HS


A1C 15.6


diabetic diet


diabetic education


endocrinology consultation pending


Code(s): R73.9 - HYPERGLYCEMIA, UNSPECIFIED   





(4) Hypokalemia


Assessment/Plan: 


replete potassium


monitor electrolytes





Code(s): E87.6 - HYPOKALEMIA   





Visit type





- Emergency Visit


Emergency Visit: Yes


ED Registration Date: 11/04/19


Care time: The patient presented to the Emergency Department on the above date 

and was hospitalized for further evaluation of their emergent condition.





- New Patient


This patient is new to me today: No





- Critical Care


Critical Care patient: No





- Discharge Referral


Referred to [facility] No

## 2019-11-07 VITALS — DIASTOLIC BLOOD PRESSURE: 61 MMHG | HEART RATE: 88 BPM | SYSTOLIC BLOOD PRESSURE: 99 MMHG | TEMPERATURE: 98.1 F

## 2019-11-07 LAB
ALBUMIN SERPL-MCNC: 2.6 G/DL (ref 3.4–5)
ALP SERPL-CCNC: 95 U/L (ref 45–117)
ALT SERPL-CCNC: 9 U/L (ref 13–61)
ANION GAP SERPL CALC-SCNC: 6 MMOL/L (ref 8–16)
AST SERPL-CCNC: 5 U/L (ref 15–37)
BASOPHILS # BLD: 0.4 % (ref 0–2)
BILIRUB SERPL-MCNC: 0.2 MG/DL (ref 0.2–1)
BUN SERPL-MCNC: 7 MG/DL (ref 7–18)
CALCIUM SERPL-MCNC: 9.1 MG/DL (ref 8.5–10.1)
CHLORIDE SERPL-SCNC: 107 MMOL/L (ref 98–107)
CO2 SERPL-SCNC: 28 MMOL/L (ref 21–32)
CREAT SERPL-MCNC: 0.4 MG/DL (ref 0.55–1.3)
DEPRECATED RDW RBC AUTO: 13.9 % (ref 11.6–15.6)
EOSINOPHIL # BLD: 0.4 % (ref 0–4.5)
GLUCOSE SERPL-MCNC: 140 MG/DL (ref 74–106)
HCT VFR BLD CALC: 33.3 % (ref 32.4–45.2)
HGB BLD-MCNC: 10.9 GM/DL (ref 10.7–15.3)
LYMPHOCYTES # BLD: 38.8 % (ref 8–40)
MAGNESIUM SERPL-MCNC: 2.1 MG/DL (ref 1.8–2.4)
MCH RBC QN AUTO: 27.5 PG (ref 25.7–33.7)
MCHC RBC AUTO-ENTMCNC: 32.7 G/DL (ref 32–36)
MCV RBC: 84 FL (ref 80–96)
MONOCYTES # BLD AUTO: 6.6 % (ref 3.8–10.2)
NEUTROPHILS # BLD: 53.8 % (ref 42.8–82.8)
PLATELET # BLD AUTO: 275 K/MM3 (ref 134–434)
PMV BLD: 9.4 FL (ref 7.5–11.1)
POTASSIUM SERPLBLD-SCNC: 3.6 MMOL/L (ref 3.5–5.1)
PROT SERPL-MCNC: 5.6 G/DL (ref 6.4–8.2)
RBC # BLD AUTO: 3.97 M/MM3 (ref 3.6–5.2)
SODIUM SERPL-SCNC: 141 MMOL/L (ref 136–145)
WBC # BLD AUTO: 7.1 K/MM3 (ref 4–10)

## 2019-11-07 RX ADMIN — MULTIVITAMIN TABLET SCH TAB: TABLET at 11:12

## 2019-11-07 RX ADMIN — INSULIN ASPART SCH: 100 INJECTION, SOLUTION INTRAVENOUS; SUBCUTANEOUS at 05:55

## 2019-11-07 RX ADMIN — INSULIN ASPART SCH UNITS: 100 INJECTION, SOLUTION INTRAVENOUS; SUBCUTANEOUS at 01:43

## 2019-11-07 RX ADMIN — Medication SCH MG: at 11:12

## 2019-11-07 RX ADMIN — INSULIN ASPART SCH UNITS: 100 INJECTION, SOLUTION INTRAVENOUS; SUBCUTANEOUS at 11:11

## 2019-11-07 NOTE — DS
Physical Exam: 


SUBJECTIVE: Patient seen and examined


23 y/o F w/ pmhx of Type 1 diabetes presenting to ED complaining LLQ abdominal 

pain onset x few days with worsening prior to ED. In the last few months the pt 

has noted that she has had polyuria, increased thirst and a 30lb weight loss (

she reports her normal weight 130lb). She was admitted and treated for 

hyperosmoolar hyperglycemia





 Vital Signs











 Period  Temp  Pulse  Resp  BP Sys/Urias  Pulse Ox


 


 Last 24 Hr  97.1 F-98.9 F    16-18  /55-68  99-99








PHYSICAL EXAM





Constitutional: Yes: Well Nourished, No Distress, Calm


Eyes: Yes: WNL, Conjunctiva Clear


HENT: Yes: WNL, Atraumatic, Normocephalic, Other (poor dentation)


Neck: Yes: WNL, Supple, Trachea Midline


Cardiovascular: Yes: WNL, Regular Rate and Rhythm, Tachycardia (HR 90s)


Respiratory: Yes: WNL, Regular, CTA Bilaterally


Gastrointestinal: Yes: WNL, Normal Bowel Sounds


...Rectal Exam: Yes: Deferred


Genitourinary: Yes: WNL


Breast(s): Yes: WNL


Musculoskeletal: Yes: WNL


Extremities: Yes: WNL


Edema: No


Peripheral Pulses WNL: Yes


Peripheral Pulses: Left Radial: 2+, Right Radial: 2+, Left Doralis Pedis: 2+, 

Right Dorsalis Pedis: 2+, Left Femoral: 2+, Right Femoral: 2+


Integumentary: Yes: WNL


Neurological: Yes: WNL, Alert, Oriented


...Motor Strength: WNL


Psychiatric: Yes: WNL, Alert, Oriented





LABS


 Laboratory Results - last 24 hr











  11/06/19 11/06/19 11/06/19





  10:25 13:39 16:35


 


WBC   


 


RBC   


 


Hgb   


 


Hct   


 


MCV   


 


MCH   


 


MCHC   


 


RDW   


 


Plt Count   


 


MPV   


 


Absolute Neuts (auto)   


 


Neutrophils %   


 


Lymphocytes %   


 


Monocytes %   


 


Eosinophils %   


 


Basophils %   


 


Nucleated RBC %   


 


Sodium   


 


Potassium   


 


Chloride   


 


Carbon Dioxide   


 


Anion Gap   


 


BUN   


 


Creatinine   


 


Est GFR (CKD-EPI)AfAm   


 


Est GFR (CKD-EPI)NonAf   


 


POC Glucometer  233  297  289


 


Random Glucose   


 


Calcium   


 


Magnesium   


 


Total Bilirubin   


 


AST   


 


ALT   


 


Alkaline Phosphatase   


 


Total Protein   


 


Albumin   














  11/06/19 11/06/19 11/06/19





  17:30 17:30 21:33


 


WBC  7.1  


 


RBC  4.26  


 


Hgb  11.6  


 


Hct  35.7  


 


MCV  83.8  


 


MCH  27.3  


 


MCHC  32.5  


 


RDW  13.6  


 


Plt Count  283  


 


MPV  9.8  


 


Absolute Neuts (auto)  4.7  


 


Neutrophils %  65.8  D  


 


Lymphocytes %  27.6  D  


 


Monocytes %  6.1  


 


Eosinophils %  0.2  


 


Basophils %  0.3  


 


Nucleated RBC %  0  


 


Sodium   137 


 


Potassium   4.2 


 


Chloride   103 


 


Carbon Dioxide   30 


 


Anion Gap   4 L 


 


BUN   5.9 L 


 


Creatinine   0.6 


 


Est GFR (CKD-EPI)AfAm   149.95 


 


Est GFR (CKD-EPI)NonAf   129.38 


 


POC Glucometer    337


 


Random Glucose   305 H 


 


Calcium   8.6 


 


Magnesium   2.0 


 


Total Bilirubin   0.2 


 


AST   6 L 


 


ALT   9 L 


 


Alkaline Phosphatase   118 H 


 


Total Protein   5.8 L 


 


Albumin   2.8 L 














  11/07/19 11/07/19 11/07/19





  01:42 05:53 07:33


 


WBC    7.1


 


RBC    3.97


 


Hgb    10.9


 


Hct    33.3


 


MCV    84.0


 


MCH    27.5


 


MCHC    32.7


 


RDW    13.9


 


Plt Count    275


 


MPV    9.4


 


Absolute Neuts (auto)    3.8


 


Neutrophils %    53.8


 


Lymphocytes %    38.8  D


 


Monocytes %    6.6


 


Eosinophils %    0.4  D


 


Basophils %    0.4


 


Nucleated RBC %    0


 


Sodium   


 


Potassium   


 


Chloride   


 


Carbon Dioxide   


 


Anion Gap   


 


BUN   


 


Creatinine   


 


Est GFR (CKD-EPI)AfAm   


 


Est GFR (CKD-EPI)NonAf   


 


POC Glucometer  189  123 


 


Random Glucose   


 


Calcium   


 


Magnesium   


 


Total Bilirubin   


 


AST   


 


ALT   


 


Alkaline Phosphatase   


 


Total Protein   


 


Albumin   











HOSPITAL COURSE:





Date of Admission:11/04/19





Date of Discharge: 11/07/19








Problem List





- Problems


(1) Prophylactic measure


Assessment/Plan: 


FEN


c/w  diabetic diet with snacks at home





Dispo


medcially cleared for discharge without without services








(2) Hyperglycemia


Assessment/Plan: 


hyperglycemic, hyperosmolar state on admission


BGM  controlled


c/w BGM AC/HS with sliding scale


c/w 10u Levemir q HS


A1C 15.6


diabetic diet


diabetic education


endocrinology to follow up after discharge








Medically stable for discharge homr


Minutes to complete discharge: 45





Discharge Summary


Problems reviewed: Yes


Reason For Visit: DIABETES MELLITUS, HYPERGLYCEMIA, VOLUME DEPLETION


Current Active Problems





Abdominal pain (Acute)


Hypokalemia (Acute)


Prophylactic measure (Acute)








Health Concerns: 


HOSPITAL COURSE:





Date of Admission:11/04/19





Date of Discharge: 11/07/19








Problem List





- Problems


(1) Prophylactic measure


Assessment/Plan: 


FEN


c/w  diabetic diet with snacks at home





Dispo


medcially cleared for discharge without without services








(2) Hyperglycemia


Assessment/Plan: 


hyperglycemic, hyperosmolar state on admission


BGM  controlled


c/w BGM AC/HS with sliding scale


c/w 10u Levemir q HS


A1C 15.6


diabetic diet


diabetic education


endocrinology to follow up after discharge








Medically stable for discharge homr


Condition: Improved





- Instructions


Diet, Activity, Other Instructions: 


Follow with your home endrinologist or you can see Dr Castellanos (number in 

discharge papers). Make an appointment with you go home for 1-2 weeks. 


You will be taking Levemir 10 u every night before bedtime





Check you blood sugar before meals and at bedtime





Follows the Novolog sliding scale:





<60 drink some juice and repeat in 1/2 hour


>150 no insulin


151-200 4 units


201-250 6 units


252-300 8 units


301-350 10 units


351-400 12 units


>401 14 units and call your provider


Referrals: 


Mica Castellanos MD [Staff Physician] - 


Disposition: HOME





- Home Medications


Comprehensive Discharge Medication List: 


Ambulatory Orders





Insulin (Levemir) [Levemir Vial] 10 units SQ HS #300 units 11/06/19 


Insulin Sliding Scale [Novolog Vial Sliding Scale -] 1 vial SQ ACHS #1 bottle 11 /06/19 


Multivitamins [Multivit ([facility] Formul[facility])] 1 tab PO DAILY  tab 11/06/19 


Thiamine HCl [Vitamin B1 -] 100 mg PO DAILY  tablet 11/06/19 








Prescription Drug Monitoring Program (I-STOP) results: I-STOP not reviewed





Problem List





- Problems


(1) Prophylactic measure


Code(s): Z29.9 - ENCOUNTER FOR PROPHYLACTIC MEASURES, UNSPECIFIED   





(2) Abdominal pain


Code(s): R10.9 - UNSPECIFIED ABDOMINAL PAIN   





(3) Hyperglycemia


Code(s): R73.9 - HYPERGLYCEMIA, UNSPECIFIED   


This patient is new to me today: No


Emergency Visit: Yes


ED Registration Date: 11/04/19


Care time: The patient presented to the Emergency Department on the above date 

and was hospitalized for further evaluation of their emergent condition.


Critical Care patient: No





- Discharge Referral


Referred to [facility] No

## 2019-11-08 LAB — MICROALBUMIN/CREAT UR: 192.2 MG/G CREAT (ref 0–30)
